# Patient Record
Sex: FEMALE | URBAN - METROPOLITAN AREA
[De-identification: names, ages, dates, MRNs, and addresses within clinical notes are randomized per-mention and may not be internally consistent; named-entity substitution may affect disease eponyms.]

---
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
## 2023-03-21 NOTE — PROCEDURE: TREATMENT REGIMEN
Action 4: Continue
Hide Cetaphil Products: No
Initiate Regimen: Benzoyl peroxide wash daily on the trunk and as needed on the face
Detail Level: Zone
Yes